# Patient Record
Sex: FEMALE | Race: WHITE | NOT HISPANIC OR LATINO | Employment: FULL TIME | URBAN - NONMETROPOLITAN AREA
[De-identification: names, ages, dates, MRNs, and addresses within clinical notes are randomized per-mention and may not be internally consistent; named-entity substitution may affect disease eponyms.]

---

## 2017-06-12 ENCOUNTER — OFFICE VISIT (OUTPATIENT)
Dept: FAMILY MEDICINE | Facility: CLINIC | Age: 62
End: 2017-06-12
Payer: COMMERCIAL

## 2017-06-12 VITALS
SYSTOLIC BLOOD PRESSURE: 104 MMHG | TEMPERATURE: 98 F | BODY MASS INDEX: 25.49 KG/M2 | WEIGHT: 153 LBS | HEIGHT: 65 IN | DIASTOLIC BLOOD PRESSURE: 64 MMHG | HEART RATE: 85 BPM | OXYGEN SATURATION: 98 %

## 2017-06-12 DIAGNOSIS — H53.9 VISION CHANGES: ICD-10-CM

## 2017-06-12 DIAGNOSIS — Z12.11 SCREEN FOR COLON CANCER: ICD-10-CM

## 2017-06-12 DIAGNOSIS — Z12.4 SCREENING FOR MALIGNANT NEOPLASM OF CERVIX: ICD-10-CM

## 2017-06-12 DIAGNOSIS — H61.22 IMPACTED CERUMEN OF LEFT EAR: ICD-10-CM

## 2017-06-12 DIAGNOSIS — Z11.59 NEED FOR HEPATITIS C SCREENING TEST: ICD-10-CM

## 2017-06-12 DIAGNOSIS — E78.5 HYPERLIPIDEMIA LDL GOAL <160: ICD-10-CM

## 2017-06-12 DIAGNOSIS — Z13.6 CARDIOVASCULAR SCREENING; LDL GOAL LESS THAN 160: ICD-10-CM

## 2017-06-12 DIAGNOSIS — Z00.00 ROUTINE GENERAL MEDICAL EXAMINATION AT A HEALTH CARE FACILITY: Primary | ICD-10-CM

## 2017-06-12 DIAGNOSIS — R91.8 PULMONARY NODULES: ICD-10-CM

## 2017-06-12 LAB
CHOLEST SERPL-MCNC: 229 MG/DL
GLUCOSE SERPL-MCNC: 99 MG/DL (ref 70–99)
HDLC SERPL-MCNC: 70 MG/DL
LDLC SERPL CALC-MCNC: 149 MG/DL
NONHDLC SERPL-MCNC: 159 MG/DL
TRIGL SERPL-MCNC: 49 MG/DL
TSH SERPL DL<=0.005 MIU/L-ACNC: 2.43 MU/L (ref 0.4–4)

## 2017-06-12 PROCEDURE — G0123 SCREEN CERV/VAG THIN LAYER: HCPCS | Performed by: FAMILY MEDICINE

## 2017-06-12 PROCEDURE — 84443 ASSAY THYROID STIM HORMONE: CPT | Performed by: FAMILY MEDICINE

## 2017-06-12 PROCEDURE — 99212 OFFICE O/P EST SF 10 MIN: CPT | Mod: 25 | Performed by: FAMILY MEDICINE

## 2017-06-12 PROCEDURE — 86803 HEPATITIS C AB TEST: CPT | Performed by: FAMILY MEDICINE

## 2017-06-12 PROCEDURE — 99386 PREV VISIT NEW AGE 40-64: CPT | Performed by: FAMILY MEDICINE

## 2017-06-12 PROCEDURE — 87624 HPV HI-RISK TYP POOLED RSLT: CPT | Performed by: FAMILY MEDICINE

## 2017-06-12 PROCEDURE — 80061 LIPID PANEL: CPT | Performed by: FAMILY MEDICINE

## 2017-06-12 PROCEDURE — 82947 ASSAY GLUCOSE BLOOD QUANT: CPT | Performed by: FAMILY MEDICINE

## 2017-06-12 PROCEDURE — 36415 COLL VENOUS BLD VENIPUNCTURE: CPT | Performed by: FAMILY MEDICINE

## 2017-06-12 NOTE — PROGRESS NOTES
SUBJECTIVE:     CC: Edelmira Lorenzo is an 61 year old woman who presents for preventive health visit.     Healthy Habits:    Do you get at least three servings of calcium containing foods daily (dairy, green leafy vegetables, etc.)? yes    Amount of exercise or daily activities, outside of work: 7 day(s) per week    Problems taking medications regularly not applicable    Medication side effects: No    Have you had an eye exam in the past two years? yes    Do you see a dentist twice per year? yes    Do you have sleep apnea, excessive snoring or daytime drowsiness?no      Discussed her emergency department visit from 12/2015.  They did find an incidental pulmonary nodule that was supposed to have follow up in 12 months. She is a never smoker, but was exposed to second hand smoke as child.     Vision changes-had bright lights in her right eye about 2 months ago. Was seen in [place] optometrist, felt it was likely an ophthalmic migraine. Hasn't had one since.     Today's PHQ-2 Score:   PHQ-2 ( 1999 [employer]) 6/12/2017 1/12/2012   Q1: Little interest or pleasure in doing things 0 0   Q2: Feeling down, depressed or hopeless 0 0   PHQ-2 Score 0 0       Abuse: Current or Past(Physical, Sexual or Emotional)- No  Do you feel safe in your environment - Yes    Social History   Substance Use Topics     Smoking status: Never Smoker     Smokeless tobacco: Not on file     Alcohol use Yes      Comment: occ     The patient does not drink >3 drinks per day nor >7 drinks per week.    Recent Labs   Lab Test  01/12/12   1711   CHOL  268*   HDL  79   LDL  176*   TRIG  65   CHOLHDLRATIO  3.0       Reviewed orders with patient.  Reviewed health maintenance and updated orders accordingly - Yes    Mammo Decision Support:  Patient over age 50, mutual decision to screen reflected in health maintenance.    Pertinent mammograms are reviewed under the imaging tab.  History of abnormal Pap smear:   Last 3 Pap Results:   PAP (no units)   Date Value  "  2017 NIL   2012 NIL   2010 NIL       Reviewed and updated as needed this visit by clinical staff  Tobacco  Allergies  Meds  Problems  Med Hx  Surg Hx  Fam Hx  Soc Hx          Reviewed and updated as needed this visit by Provider  Allergies  Meds  Problems        Past Medical History:   Diagnosis Date     Closed fracture of multiple ribs, unspecified      Unspecified viral meningitis       Past Surgical History:   Procedure Laterality Date     SURGICAL HISTORY OF -       (R) Breast lumpectomy - benign     Obstetric History       T0      L0     SAB0   TAB0   Ectopic0   Multiple0   Live Births0       # Outcome Date GA Lbr Saul/2nd Weight Sex Delivery Anes PTL Lv   2 Para            1 Para                   ROS:  C: NEGATIVE for fever, chills, change in weight  I: NEGATIVE for worrisome rashes, moles or lesions  E: NEGATIVE for vision changes or irritation  ENT: NEGATIVE for ear, mouth and throat problems  R: NEGATIVE for significant cough or SOB  B: NEGATIVE for masses, tenderness or discharge  CV: NEGATIVE for chest pain, palpitations or peripheral edema  GI: NEGATIVE for nausea, abdominal pain, heartburn, or change in bowel habits  : NEGATIVE for unusual urinary or vaginal symptoms. No vaginal bleeding.  M: NEGATIVE for significant arthralgias or myalgia  N: NEGATIVE for weakness, dizziness or paresthesias  P: NEGATIVE for changes in mood or affect     BP Readings from Last 3 Encounters:   17 104/64   10/27/15 117/67   12 118/78    Wt Readings from Last 3 Encounters:   17 153 lb (69.4 kg)   12 163 lb (73.9 kg)   12 166 lb (75.3 kg)                  OBJECTIVE:     /64  Pulse 85  Temp 98  F (36.7  C) (Tympanic)  Ht 5' 5\" (1.651 m)  Wt 153 lb (69.4 kg)  LMP 10/21/2006  SpO2 98%  BMI 25.46 kg/m2  EXAM:  GENERAL APPEARANCE: healthy, alert and no distress  EYES: Eyes grossly normal to inspection, PERRL and conjunctivae and " sclerae normal  HENT: nose and mouth without ulcers or lesions, oropharynx clear, oral mucous membranes moist, right ear: normal: no effusions, no erythema, normal landmarks and left ear: occluded with wax  NECK: no adenopathy, no asymmetry, masses, or scars and thyroid normal to palpation  RESP: lungs clear to auscultation - no rales, rhonchi or wheezes  BREAST: normal without masses, tenderness or nipple discharge and no palpable axillary masses or adenopathy  CV: regular rate and rhythm, normal S1 S2, no S3 or S4, no murmur, click or rub, no peripheral edema and peripheral pulses strong  ABDOMEN: soft, nontender, no hepatosplenomegaly, no masses and bowel sounds normal   (female): normal female external genitalia, normal urethral meatus, vaginal mucosal atrophy noted, normal cervix, adnexae, and uterus without masses or abnormal discharge, pap taken  MS: no musculoskeletal defects are noted and gait is age appropriate without ataxia  SKIN: no suspicious lesions or rashes  NEURO: Normal strength and tone, sensory exam grossly normal, mentation intact and speech normal  PSYCH: mentation appears normal and affect normal/bright    ASSESSMENT/PLAN:     Edelmira was seen today for physical.    Diagnoses and all orders for this visit:    Routine general medical examination at a health care facility  -     Glucose    Screen for colon cancer  -     Fecal colorectal cancer screen FIT; Future    Screening for malignant neoplasm of cervix  -     Pap imaged thin layer screen with HPV - recommended age 30 - 65  -     HPV High Risk Types DNA Cervical    Need for hepatitis C screening test  -     Hepatitis C Screen Reflex to HCV RNA Quant and Genotype    Pulmonary nodules  -     CT Chest w/o Contrast; Future    Vision changes  Follow with Ophthalmology     CARDIOVASCULAR SCREENING; LDL GOAL LESS THAN 160  -     Lipid panel reflex to direct LDL    Hyperlipidemia LDL goal <160  -     TSH with free T4 reflex    Impacted cerumen of  "left ear    Other orders  -     A pap thin layer screen    left ear lavaged  CT scan ordered    COUNSELING:   Reviewed preventive health counseling, as reflected in patient instructions       Regular exercise       Healthy diet/nutrition       Vision screening       Osteoporosis Prevention/Bone Health         reports that she has never smoked. She does not have any smokeless tobacco history on file.    Estimated body mass index is 25.46 kg/(m^2) as calculated from the following:    Height as of this encounter: 5' 5\" (1.651 m).    Weight as of this encounter: 153 lb (69.4 kg).       Counseling Resources:  ATP IV Guidelines  Pooled Cohorts Equation Calculator  Breast Cancer Risk Calculator  FRAX Risk Assessment  ICSI Preventive Guidelines  Dietary Guidelines for Americans, 2010  USDA's MyPlate  ASA Prophylaxis  Lung CA Screening    Indy Ascencio MD  [facility]  "

## 2017-06-12 NOTE — LETTER
June 19, 2017    Edelmira THOMAS Bj  [address] [place] [address]    Dear Edelmira,  We are happy to inform you that your PAP smear result from 6/12/17 is normal.  We are now able to do a follow up test on PAP smears. The DNA test is for HPV (Human Papilloma Virus). Cervical cancer is closely linked with certain types of HPV. Your result showed no evidence of high risk HPV.  Therefore we recommend you return in 3 years for your next pap smear and HPV test.  You will still need to return to the clinic every year for an annual exam and other preventive tests.  Please contact the clinic at [phone] with any questions.  Sincerely,    Indy Ascencio MD/kati

## 2017-06-12 NOTE — LETTER
[facility]  [address] [facility] [place] [address]  Phone: [phone]    June 20, 2017    Edelmira THOMAS Bj  [address] [place] [address]    Dear Ms. Lorenzo,    I am writing to inform you of the results of the laboratory tests you had done recently.     Recent Labs   Lab Test  06/12/17   1010  01/12/12   1711   CHOL  229*  268*   HDL  70  79   LDL  149*  176*   TRIG  49  65   CHOLHDLRATIO   --   3.0   NHDL  159*   --      Goal levels are:  Cholesterol: Desirable is less than 200. Borderline is 200-240.  HDL (good cholesterol): Desirable is greater than 40 in men, 50 in women.  LDL (bad cholesterol): Desirable is less than 130, or less than 100 if you have heart disease, peripheral vascular disease or diabetes. Borderline is 130-160.  Triglycerides: Desirable is less than 150. Borderline is 150-300.   Your values are ok.     Your glucose, or blood sugar, a test for diabetes, was 99. Normal is 60-99.  The TSH looks at thyroid function.  Yours was normal.  Your hepatitis C screening was negative.     It is my pleasure to participate in your health care needs. Please feel free to call if any questions or concerns.    Sincerely,          Indy Velazquez MD

## 2017-06-12 NOTE — MR AVS SNAPSHOT
After Visit Summary   6/12/2017    Edelmira Lorenzo    MRN: [id number]           Patient Information     Date Of Birth          1955        Visit Information        Provider Department      6/12/2017 9:00 AM Indy Ascencio MD [facility]        Today's Diagnoses     Routine general medical examination at a health care facility    -  1    Screen for colon cancer        Screening for malignant neoplasm of cervix        Need for hepatitis C screening test        Pulmonary nodules        Vision changes        CARDIOVASCULAR SCREENING; LDL GOAL LESS THAN 160        Hyperlipidemia LDL goal <160        Impacted cerumen of left ear          Care Instructions      Preventive Health Recommendations  Female Ages 50 - 64    Yearly exam: See your health care provider every year in order to  o Review health changes.   o Discuss preventive care.    o Review your medicines if your doctor has prescribed any.      Get a Pap test every three years (unless you have an abnormal result and your provider advises testing more often).    If you get Pap tests with HPV test, you only need to test every 5 years, unless you have an abnormal result.     You do not need a Pap test if your uterus was removed (hysterectomy) and you have not had cancer.    You should be tested each year for STDs (sexually transmitted diseases) if you're at risk.     Have a mammogram every 1 to 2 years.    Have a colonoscopy at age 50, or have a yearly FIT test (stool test). These exams screen for colon cancer.      Have a cholesterol test every 5 years, or more often if advised.    Have a diabetes test (fasting glucose) every three years. If you are at risk for diabetes, you should have this test more often.     If you are at risk for osteoporosis (brittle bone disease), think about having a bone density scan (DEXA).    Shots: Get a flu shot each year. Get a tetanus shot every 10 years.    Nutrition:     Eat at least 5 servings of  fruits and vegetables each day.    Eat whole-grain bread, whole-wheat pasta and brown rice instead of white grains and rice.    Talk to your provider about Calcium and Vitamin D.     Lifestyle    Exercise at least 150 minutes a week (30 minutes a day, 5 days a week). This will help you control your weight and prevent disease.    Limit alcohol to one drink per day.    No smoking.     Wear sunscreen to prevent skin cancer.     See your dentist every six months for an exam and cleaning.    See your eye doctor every 1 to 2 years.            Follow-ups after your visit        Your next 10 appointments already scheduled     Jun 19, 2017  9:15 AM CDT   MA SCREENING DIGITAL BILATERAL with [id number]   [facility] ([facility])    [address] [address] [phone]           Do not use any powder, lotion or deodorant under your arms or on your breast. If you do, we will ask you to remove it before your exam.  Wear comfortable, two-piece clothing.  If you have any allergies, tell your care team.  Bring any previous mammograms from other facilities or have them mailed to the breast center.              Future tests that were ordered for you today     Open Future Orders        Priority Expected Expires Ordered    CT Chest w/o Contrast Routine  6/12/2018 6/12/2017    Fecal colorectal cancer screen FIT Routine 7/3/2017 9/4/2017 6/12/2017            Who to contact     If you have questions or need follow up information about today's clinic visit or your schedule please contact [facility] directly at [phone].  Normal or non-critical lab and imaging results will be communicated to you by MyChart, letter or phone within 4 business days after the clinic has received the results. If you do not hear from us within 7 days, please contact the clinic through MyChart or phone. If you have a critical or abnormal lab result, we will notify you by phone as soon as  "possible.  Submit refill requests through [employer] or call your pharmacy and they will forward the refill request to us. Please allow 3 business days for your refill to be completed.          Additional Information About Your Visit        [employer] Information     [employer] lets you send messages to your doctor, view your test results, renew your prescriptions, schedule appointments and more. To sign up, go to [email or web address] . Click on \"Log in\" on the left side of the screen, which will take you to the Welcome page. Then click on \"Sign up Now\" on the right side of the page.     You will be asked to enter the access code listed below, as well as some personal information. Please follow the directions to create your username and password.     Your access code is: [email or web address]  Expires: 9/10/2017  9:56 AM     Your access code will  in 90 days. If you need help or a new code, please call your [place] clinic or [phone].        Care EveryWhere ID     This is your Care EveryWhere ID. This could be used by other organizations to access your [place] medical records  [id number]        Your Vitals Were     Pulse Temperature Height Last Period Pulse Oximetry BMI (Body Mass Index)    85 98  F (36.7  C) (Tympanic) 5' 5\" (1.651 m) 10/21/2006 98% 25.46 kg/m2       Blood Pressure from Last 3 Encounters:   17 104/64   10/27/15 117/67   12 118/78    Weight from Last 3 Encounters:   17 153 lb (69.4 kg)   12 163 lb (73.9 kg)   12 166 lb (75.3 kg)              We Performed the Following     Glucose     Hepatitis C Screen Reflex to HCV RNA Quant and Genotype     HPV High Risk Types DNA Cervical     Lipid panel reflex to direct LDL     Pap imaged thin layer screen with HPV - recommended age 30 - 65     TSH with free T4 reflex          Today's Medication Changes          These changes are accurate as of: 17  9:56 AM.  If you have any questions, ask your nurse or doctor.             "   Stop taking these medicines if you haven't already. Please contact your care team if you have questions.     MULTI-VITAMIN PO   Stopped by:  Indy Ascencio MD                    Primary Care Provider Office Phone # Fax #    Indy Ascencio [phone]       [facility] [address] [facility] [address]        Thank you!     Thank you for choosing [facility]  for your care. Our goal is always to provide you with excellent care. Hearing back from our patients is one way we can continue to improve our services. Please take a few minutes to complete the written survey that you may receive in the mail after your visit with us. Thank you!             Your Updated Medication List - Protect others around you: Learn how to safely use, store and throw away your medicines at www.disposemymeds.org.          This list is accurate as of: 6/12/17  9:56 AM.  Always use your most recent med list.                   Brand Name Dispense Instructions for use    aspirin 325 MG EC tablet      2 TABLET EVERY 4 HOURS AS NEEDED

## 2017-06-14 LAB
COPATH REPORT: NORMAL
HCV AB SERPL QL IA: NORMAL
PAP: NORMAL

## 2017-06-16 LAB
FINAL DIAGNOSIS: NORMAL
HPV HR 12 DNA CVX QL NAA+PROBE: NEGATIVE
HPV16 DNA SPEC QL NAA+PROBE: NEGATIVE
HPV18 DNA SPEC QL NAA+PROBE: NEGATIVE
SPECIMEN DESCRIPTION: NORMAL

## 2017-06-19 ENCOUNTER — HOSPITAL ENCOUNTER (OUTPATIENT)
Dept: CT IMAGING | Facility: CLINIC | Age: 62
End: 2017-06-19
Attending: FAMILY MEDICINE
Payer: COMMERCIAL

## 2017-06-19 ENCOUNTER — HOSPITAL ENCOUNTER (OUTPATIENT)
Dept: MAMMOGRAPHY | Facility: CLINIC | Age: 62
Discharge: HOME | End: 2017-06-19
Attending: FAMILY MEDICINE | Admitting: FAMILY MEDICINE
Payer: COMMERCIAL

## 2017-06-19 DIAGNOSIS — R91.8 PULMONARY NODULES: ICD-10-CM

## 2017-06-19 DIAGNOSIS — Z12.31 VISIT FOR SCREENING MAMMOGRAM: ICD-10-CM

## 2017-06-19 PROCEDURE — G0202 SCR MAMMO BI INCL CAD: HCPCS

## 2017-06-19 PROCEDURE — 71250 CT THORAX DX C-: CPT

## 2017-06-27 PROCEDURE — 82274 ASSAY TEST FOR BLOOD FECAL: CPT | Performed by: FAMILY MEDICINE

## 2017-06-28 DIAGNOSIS — Z12.11 SCREEN FOR COLON CANCER: ICD-10-CM

## 2017-06-28 LAB — HEMOCCULT STL QL IA: NEGATIVE

## 2017-07-14 ENCOUNTER — HOSPITAL ENCOUNTER (EMERGENCY)
Facility: CLINIC | Age: 62
Discharge: LEFT BEFORE DISCHARGE | End: 2017-07-14
Attending: FAMILY MEDICINE | Admitting: FAMILY MEDICINE
Payer: COMMERCIAL

## 2017-07-14 VITALS
OXYGEN SATURATION: 97 % | HEIGHT: 65 IN | TEMPERATURE: 97 F | BODY MASS INDEX: 25.49 KG/M2 | WEIGHT: 153 LBS | RESPIRATION RATE: 18 BRPM | DIASTOLIC BLOOD PRESSURE: 78 MMHG | SYSTOLIC BLOOD PRESSURE: 132 MMHG | HEART RATE: 78 BPM

## 2017-07-14 DIAGNOSIS — R20.9 DISTURBANCE OF SKIN SENSATION: ICD-10-CM

## 2017-07-14 LAB
BASOPHILS # BLD AUTO: 0 10E9/L (ref 0–0.2)
BASOPHILS NFR BLD AUTO: 0.1 %
CRP SERPL-MCNC: <2.9 MG/L (ref 0–8)
DIFFERENTIAL METHOD BLD: NORMAL
EOSINOPHIL # BLD AUTO: 0.1 10E9/L (ref 0–0.7)
EOSINOPHIL NFR BLD AUTO: 1.8 %
ERYTHROCYTE [DISTWIDTH] IN BLOOD BY AUTOMATED COUNT: 13 % (ref 10–15)
ERYTHROCYTE [SEDIMENTATION RATE] IN BLOOD BY WESTERGREN METHOD: 4 MM/H (ref 0–30)
HCT VFR BLD AUTO: 44.1 % (ref 35–47)
HGB BLD-MCNC: 15.5 G/DL (ref 11.7–15.7)
IMM GRANULOCYTES # BLD: 0 10E9/L (ref 0–0.4)
IMM GRANULOCYTES NFR BLD: 0.1 %
LYMPHOCYTES # BLD AUTO: 1.2 10E9/L (ref 0.8–5.3)
LYMPHOCYTES NFR BLD AUTO: 17.8 %
MCH RBC QN AUTO: 31.6 PG (ref 26.5–33)
MCHC RBC AUTO-ENTMCNC: 35.1 G/DL (ref 31.5–36.5)
MCV RBC AUTO: 90 FL (ref 78–100)
MONOCYTES # BLD AUTO: 0.6 10E9/L (ref 0–1.3)
MONOCYTES NFR BLD AUTO: 8.2 %
NEUTROPHILS # BLD AUTO: 4.9 10E9/L (ref 1.6–8.3)
NEUTROPHILS NFR BLD AUTO: 72 %
PLATELET # BLD AUTO: 312 10E9/L (ref 150–450)
RBC # BLD AUTO: 4.91 10E12/L (ref 3.8–5.2)
WBC # BLD AUTO: 6.8 10E9/L (ref 4–11)

## 2017-07-14 PROCEDURE — 99282 EMERGENCY DEPT VISIT SF MDM: CPT

## 2017-07-14 PROCEDURE — 86666 EHRLICHIA ANTIBODY: CPT | Performed by: FAMILY MEDICINE

## 2017-07-14 PROCEDURE — 85652 RBC SED RATE AUTOMATED: CPT | Performed by: FAMILY MEDICINE

## 2017-07-14 PROCEDURE — 86618 LYME DISEASE ANTIBODY: CPT | Performed by: FAMILY MEDICINE

## 2017-07-14 PROCEDURE — 99283 EMERGENCY DEPT VISIT LOW MDM: CPT | Performed by: FAMILY MEDICINE

## 2017-07-14 PROCEDURE — 85025 COMPLETE CBC W/AUTO DIFF WBC: CPT | Performed by: FAMILY MEDICINE

## 2017-07-14 PROCEDURE — 86140 C-REACTIVE PROTEIN: CPT | Performed by: FAMILY MEDICINE

## 2017-07-14 RX ORDER — ASPIRIN 325 MG
650 TABLET ORAL DAILY PRN
COMMUNITY

## 2017-07-14 ASSESSMENT — ENCOUNTER SYMPTOMS
SHORTNESS OF BREATH: 0
DIFFICULTY URINATING: 0
HEADACHES: 1
FEVER: 0
JOINT SWELLING: 0
NUMBNESS: 1
DYSURIA: 0
FATIGUE: 1
ACTIVITY CHANGE: 0
NAUSEA: 0

## 2017-07-14 NOTE — ED PROVIDER NOTES
History     Chief Complaint   Patient presents with     Numbness     numbness and tingling in hands and feet x 3 days. no other symptoms     HPI  Edelmira Lorenzo is a 61 year old female with a remote history of viral meningitis who presents to the ED today for evaluation of a 3 day history of numbness and tingling in bilateral hands and feet. Patient states that 3 days ago she started to develop intermittent bilateral numbness in her hands and feet. She reports she is still able to do everyday activities and her balance and coordination are normal. She does admit to feeling more fatigued recently as well as a mild headache behind her eyes. The patient states she has been exposed to ticks recently [place]. She otherwise denies any nausea, vision changes, difficulty speaking, chest pain and a rash. She reports no ill contacts with unusual neurological symptoms. She is a nonsmoker.     Patient Active Problem List   Diagnosis     CARDIOVASCULAR SCREENING; LDL GOAL LESS THAN 160     Advanced directives, counseling/discussion     Pulmonary nodules     Current Outpatient Prescriptions   Medication Sig Dispense Refill     aspirin 325 MG tablet Take 650 mg by mouth daily as needed for moderate pain       No Known Allergies    I have reviewed the Medications, Allergies, Past Medical and Surgical History, and Social History in the Epic system.         Review of Systems   Constitutional: Positive for fatigue. Negative for activity change and fever.   Eyes: Negative for visual disturbance.   Respiratory: Negative for shortness of breath.    Cardiovascular: Negative for chest pain.   Gastrointestinal: Negative for nausea.   Genitourinary: Negative for difficulty urinating and dysuria.   Musculoskeletal: Negative for joint swelling.   Skin: Negative for rash.   Neurological: Positive for numbness and headaches.   Hematological: Negative.    Psychiatric/Behavioral: Negative.    Unremarkable except  "noted above.     Physical Exam   BP: 132/78  Pulse: 78  Temp: 97  F (36.1  C)  Resp: 18  Height: 165.1 cm (5' 5\")  Weight: 69.4 kg (153 lb)  SpO2: 97 %  Physical Exam   Constitutional: She is oriented to person, place, and time. She appears well-developed. No distress.   HENT:   Head: Normocephalic.   Mouth/Throat: Oropharynx is clear and moist.   Eyes: EOM are normal. Pupils are equal, round, and reactive to light.   Neck: Neck supple. No thyromegaly present.   Cardiovascular: Regular rhythm.    No murmur heard.  Pulmonary/Chest: Breath sounds normal.   Abdominal: She exhibits no distension.   Musculoskeletal: She exhibits no edema or tenderness.   Lymphadenopathy:     She has no cervical adenopathy.   Neurological: She is oriented to person, place, and time. No cranial nerve deficit. She exhibits normal muscle tone. Coordination normal.   Skin: No rash noted.   Psychiatric: She has a normal mood and affect.       ED Course     ED Course     Procedures             Critical Care time:  none               Labs Ordered and Resulted from Time of ED Arrival Up to the Time of Departure from the ED - No data to display  No results found for this or any previous visit (from the past 24 hour(s)).    Medications - No data to display    5:14 PM Patient assessed.     Assessments & Plan (with Medical Decision Making)     Edelmira presents with recent SX of numbness/tingling in hands and feet w/o motor or coordination problems. She is in o/w good health. Her examination was normal. She appeared at low probability of having major problems such as cerebrovascular or Diabetes. We sent some labs to R/O certain conditions of infectious or metabolic nature. She will /U on results. Expectant management was advised and she is agreeable with this plan.      I have reviewed the nursing notes.    I have reviewed the findings, diagnosis, plan and need for follow up with the patient.       Discharge Medication List as of 7/14/2017  5:39 PM    "       Final diagnoses:   Disturbance of skin sensation     This document serves as a record of the services and decisions personally performed and made by Vinny Wilson MD. It was created on HIS/HER behalf by Karen Waite, a trained medical scribe. The creation of this document is based the provider's statements to the medical scribe.  Karen Waite 5:14 PM 7/14/2017    Provider:   The information in this document, created by the medical scribe for me, accurately reflects the services I personally performed and the decisions made by me. I have reviewed and approved this document for accuracy prior to leaving the patient care area.  Vinny Wilson MD 5:14 PM 7/14/2017 7/14/2017   [facility] EMERGENCY DEPARTMENT     Vinny Wilson MD  07/21/17 0827

## 2017-07-14 NOTE — ED AVS SNAPSHOT
[facility]    [address] [address]    Phone:  [phone]    Fax:  [phone]                                       Edelmira Lorenzo   MRN: [id number]    Department:  [facility]   Date of Visit:  7/14/2017           After Visit Summary Signature Page     I have received my discharge instructions, and my questions have been answered. I have discussed any challenges I see with this plan with the nurse or doctor.    ..........................................................................................................................................  Patient/Patient Representative Signature      ..........................................................................................................................................  Patient Representative Print Name and Relationship to Patient    ..................................................               ................................................  Date                                            Time    ..........................................................................................................................................  Reviewed by Signature/Title    ...................................................              ..............................................  Date                                                            Time

## 2017-07-14 NOTE — ED AVS SNAPSHOT
[facility] Emergency Department    [address] [address]    Phone:  [phone]    Fax:  [phone]                                       Edelmira Lorenzo   MRN: [id number]    Department:  [facility] Emergency Department   Date of Visit:  7/14/2017           Patient Information     Date Of Birth          1955        Your diagnoses for this visit were:     Disturbance of skin sensation        You were seen by Vinny Wilson MD.        Discharge Instructions         Please call your Clinic and have Nurse check your labs.    No activity restrictions.    24 Hour Appointment Hotline       To make an appointment at any [facility], call [phone] ([phone]). If you don't have a family doctor or clinic, we will help you find one. [facility] clinics are conveniently located to serve the needs of you and your family.             Review of your medicines      Our records show that you are taking the medicines listed below. If these are incorrect, please call your family doctor or clinic.        Dose / Directions Last dose taken    aspirin 325 MG tablet   Dose:  650 mg        Take 650 mg by mouth daily as needed for moderate pain   Refills:  0                Procedures and tests performed during your visit     Anaplasma phagocytoph antibody IgG IgM    CBC with platelets, differential    CRP Inflammation    Erythrocyte sedimentation rate auto    Lyme Disease Dot with reflex to WB Serum      Orders Needing Specimen Collection     None      Pending Results     No orders found from 7/12/2017 to 7/15/2017.            Pending Culture Results     No orders found from 7/12/2017 to 7/15/2017.            Pending Results Instructions     If you had any lab results that were not finalized at the time of your Discharge, you can call the ED Lab Result RN at [phone]. You will be contacted by this team for any positive Lab results or changes in treatment. The nurses are available 7 days  "a week from 10A to 6:30P.  You can leave a message 24 hours per day and they will return your call.        Test Results From Your Hospital Stay               Thank you for choosing [facility]       Thank you for choosing [facility] for your care. Our goal is always to provide you with excellent care. Hearing back from our patients is one way we can continue to improve our services. Please take a few minutes to complete the written survey that you may receive in the mail after you visit with us. Thank you!        [employer] Information     [employer] lets you send messages to your doctor, view your test results, renew your prescriptions, schedule appointments and more. To sign up, go to [email or web address] . Click on \"Log in\" on the left side of the screen, which will take you to the Welcome page. Then click on \"Sign up Now\" on the right side of the page.     You will be asked to enter the access code listed below, as well as some personal information. Please follow the directions to create your username and password.     Your access code is: [email or web address]  Expires: 9/10/2017  9:56 AM     Your access code will  in 90 days. If you need help or a new code, please call your [facility] clinic or [phone].        Care EveryWhere ID     This is your Care EveryWhere ID. This could be used by other organizations to access your [facility] medical records  [id number]        Equal Access to Services     GILDARDO POSEY : Hadpaloma Harvey, waaxda david, qaybta kaaljaspreet welsh . So [facility] [phone].    ATENCIÓN: Si habla español, tiene a marley disposición servicios gratuitos de asistencia lingüística. Chung al [phone].    We comply with applicable federal civil rights laws and [place] laws. We do not discriminate on the basis of race, color, national origin, age, disability sex, sexual orientation or gender identity.            After Visit Summary       This is your " record. Keep this with you and show to your community pharmacist(s) and doctor(s) at your next visit.

## 2017-07-14 NOTE — ED NOTES
"Pt here with concerns of possible \"tick exposure, [place] that had many ticks falling off him\" onset Wednesday with tingling and numbness on and off.    "

## 2017-07-17 LAB
A PHAGOCYTOPH IGG TITR SER IF: NORMAL {TITER}
A PHAGOCYTOPH IGM TITR SER IF: NORMAL {TITER}
B BURGDOR IGG+IGM SER QL: NORMAL (ref 0–0.89)

## 2017-07-21 ASSESSMENT — ENCOUNTER SYMPTOMS
PSYCHIATRIC NEGATIVE: 1
HEMATOLOGIC/LYMPHATIC NEGATIVE: 1

## 2018-06-21 ENCOUNTER — HOSPITAL ENCOUNTER (OUTPATIENT)
Dept: MAMMOGRAPHY | Facility: CLINIC | Age: 63
Discharge: HOME | End: 2018-06-21
Attending: FAMILY MEDICINE | Admitting: FAMILY MEDICINE
Payer: COMMERCIAL

## 2018-06-21 DIAGNOSIS — Z12.31 VISIT FOR SCREENING MAMMOGRAM: ICD-10-CM

## 2018-06-21 PROCEDURE — 77067 SCR MAMMO BI INCL CAD: CPT

## 2019-11-02 ENCOUNTER — IMMUNIZATION (OUTPATIENT)
Dept: FAMILY MEDICINE | Facility: CLINIC | Age: 64
End: 2019-11-02
Payer: COMMERCIAL

## 2019-11-02 PROCEDURE — 90682 RIV4 VACC RECOMBINANT DNA IM: CPT

## 2019-11-02 PROCEDURE — 90471 IMMUNIZATION ADMIN: CPT

## 2021-03-13 ENCOUNTER — IMMUNIZATION (OUTPATIENT)
Dept: FAMILY MEDICINE | Facility: CLINIC | Age: 66
End: 2021-03-13
Payer: COMMERCIAL

## 2021-03-13 PROCEDURE — 0011A PR COVID VAC MODERNA 100 MCG/0.5 ML IM: CPT

## 2021-03-13 PROCEDURE — 91301 PR COVID VAC MODERNA 100 MCG/0.5 ML IM: CPT

## 2021-04-04 ENCOUNTER — HEALTH MAINTENANCE LETTER (OUTPATIENT)
Age: 66
End: 2021-04-04

## 2021-04-10 ENCOUNTER — IMMUNIZATION (OUTPATIENT)
Dept: FAMILY MEDICINE | Facility: CLINIC | Age: 66
End: 2021-04-10
Attending: FAMILY MEDICINE
Payer: COMMERCIAL

## 2021-04-10 PROCEDURE — 0012A PR COVID VAC MODERNA 100 MCG/0.5 ML IM: CPT

## 2021-04-10 PROCEDURE — 91301 PR COVID VAC MODERNA 100 MCG/0.5 ML IM: CPT

## 2021-09-19 ENCOUNTER — HEALTH MAINTENANCE LETTER (OUTPATIENT)
Age: 66
End: 2021-09-19

## 2022-03-29 ENCOUNTER — OFFICE VISIT (OUTPATIENT)
Dept: FAMILY MEDICINE | Facility: CLINIC | Age: 67
End: 2022-03-29
Payer: COMMERCIAL

## 2022-03-29 ENCOUNTER — TELEPHONE (OUTPATIENT)
Dept: FAMILY MEDICINE | Facility: CLINIC | Age: 67
End: 2022-03-29

## 2022-03-29 VITALS
OXYGEN SATURATION: 100 % | HEART RATE: 75 BPM | RESPIRATION RATE: 16 BRPM | BODY MASS INDEX: 27.99 KG/M2 | HEIGHT: 65 IN | WEIGHT: 168 LBS | TEMPERATURE: 97.5 F | DIASTOLIC BLOOD PRESSURE: 78 MMHG | SYSTOLIC BLOOD PRESSURE: 124 MMHG

## 2022-03-29 DIAGNOSIS — H61.22 IMPACTED CERUMEN OF LEFT EAR: Primary | ICD-10-CM

## 2022-03-29 DIAGNOSIS — Z12.11 SCREEN FOR COLON CANCER: ICD-10-CM

## 2022-03-29 DIAGNOSIS — M54.2 CERVICALGIA: ICD-10-CM

## 2022-03-29 PROCEDURE — 69209 REMOVE IMPACTED EAR WAX UNI: CPT | Mod: LT | Performed by: INTERNAL MEDICINE

## 2022-03-29 PROCEDURE — 99202 OFFICE O/P NEW SF 15 MIN: CPT | Mod: 25 | Performed by: INTERNAL MEDICINE

## 2022-03-29 ASSESSMENT — PAIN SCALES - GENERAL: PAINLEVEL: NO PAIN (1)

## 2022-03-29 NOTE — TELEPHONE ENCOUNTER
While at todays appointment , Dr. Renteria forgot to give patient a FIT test to patient .   FIT test can not be mailed yet as temps outside still go below freezing.     I will leave a FIT test in a envelope at  in [facility] [place] for her to . - Bhaskar CARRENO CMA

## 2022-03-29 NOTE — PATIENT INSTRUCTIONS
Health Care Maintenance:  1. Recommend FIT test for colon cancer screening  2. You are due for a mammogram.  Please call [phone] to schedule.  3. Recommend tetanus and pneumonia     Swollen neck:  1. Exam was normal today  2. If symptoms persist, then follow-up - would check thyroid and blood count, and ultrasound of neck    Ears:  1. Will remove wax today

## 2022-03-29 NOTE — PROGRESS NOTES
"  Assessment & Plan     Impacted cerumen of left ear -CMA lavaged with good results.  Patient had resolution of discomfort.  - REMOVE IMPACTED CERUMEN    Cervicalgia -she has been having tension pain in her neck.  If the removal of the cerumen and neck pain does not resolve her symptoms, advised to follow-up in clinic.  No palpable lymph nodes on exam today    Screen for colon cancer -the team neglected to give her the fit test before she left.  We will mail it out  - Fecal colorectal cancer screen (FIT); Future             BMI:   Estimated body mass index is 27.96 kg/m  as calculated from the following:    Height as of this encounter: 1.651 m (5' 5\").    Weight as of this encounter: 76.2 kg (168 lb).       Patient Instructions   Health Care Maintenance:  1. Recommend FIT test for colon cancer screening  2. You are due for a mammogram.  Please call [phone] to schedule.  3. Recommend tetanus and pneumonia     Swollen neck:  1. Exam was normal today  2. If symptoms persist, then follow-up - would check thyroid and blood count, and ultrasound of neck    Ears:  1. Will remove wax today      No follow-ups on file.    Nena Renteria [facility]    Pam Hickey is a 66 year old who presents for the following health issues     History of Present Illness       Reason for visit:  I have wax in my ears that is bothering me,  also swollen nodes in neck -  Symptom onset:  1-2 weeks ago  Symptoms include:  Same as above  Symptom intensity:  Mild  Symptom progression:  Improving  Had these symptoms before:  Yes  Has tried/received treatment for these symptoms:  Yes  Previous treatment was successful:  Yes  Prior treatment description:  Removed wax form ears  What makes it worse:  No  What makes it better:  No    She eats 4 or more servings of fruits and vegetables daily.She consumes 1 sweetened beverage(s) daily.She exercises with enough effort to increase her heart rate 9 or less minutes " -- DO NOT REPLY / DO NOT REPLY ALL --  -- Message is from the [facility]--    Result Request  Type of Test / Lab: mammogram   Date Test / Lab: 11-  Location: [facility]   Test / Lab ordered/authorized by: Dr hurley    Other comments:pelase call to go over results. patient received an email stating that she has to have the test redone because something was seen     Preferred Delivery Method   Call back patient with results.  Phone number:  [phone]    Caller Information       Type Contact Phone    12/07/2020 04:43 PM CST Phone (Incoming) Sandhya Wheatley (Self) [phone] (M)          Alternative phone number: none    Turnaround time given to caller:   \"This message will be sent to [state Provider's name]. The clinical team will fulfill your request as soon as they review your message.\"   "per day.  She exercises with enough effort to increase her heart rate 7 days per week.   She is taking medications regularly.       Acute Illness  Acute illness concerns: swollen lymph nodes   Onset/Duration: 1 week  Symptoms:  Fever: no  Chills/Sweats: no  Headache (location?): YES- minor  Sinus Pressure: no  Conjunctivitis:  no  Ear Pain: YES: bilateral  Rhinorrhea: no  Congestion: no  Sore Throat: no  Cough: no  Wheeze: no  Decreased Appetite: no  Nausea: no  Vomiting: no  Diarrhea: no  Dysuria/Freq.: no  Dysuria or Hematuria: no  Fatigue/Achiness: no  Sick/Strep Exposure: no  Therapies tried and outcome: ASA as needed  --thinks lymph nodes in neck are enlarged  --thinks it is ear wax  --no URI symptoms of cough/cold  --no fevers/chills, night sweats  -having neck pain that she feels is muscle tension; is not feeling well because of this .          Current Outpatient Medications   Medication Sig Dispense Refill     aspirin 325 MG tablet Take 650 mg by mouth daily as needed for moderate pain          Review of Systems   Constitutional, HEENT, cardiovascular, pulmonary, GI, , musculoskeletal, neuro, skin, endocrine and psych systems are negative, except as otherwise noted.      Objective    /78 (BP Location: Right arm, Patient Position: Chair, Cuff Size: Adult Regular)   Pulse 75   Temp 97.5  F (36.4  C) (Tympanic)   Resp 16   Ht 1.651 m (5' 5\")   Wt 76.2 kg (168 lb)   LMP 10/21/2006   SpO2 100%   BMI 27.96 kg/m    Body mass index is 27.96 kg/m .  Physical Exam   GENERAL APPEARANCE: healthy, alert and no distress  HENT: nose and mouth without ulcers or lesions, right TM's pearly grey, normal light reflex  and left canal occluded with cerumen.  CMA lavaged with good results, canal clear and TM normal  NECK: no adenopathy, no asymmetry, masses, or scars and thyroid normal to palpation                "

## 2022-04-14 ENCOUNTER — OFFICE VISIT (OUTPATIENT)
Dept: FAMILY MEDICINE | Facility: CLINIC | Age: 67
End: 2022-04-14
Payer: COMMERCIAL

## 2022-04-14 VITALS
BODY MASS INDEX: 27.79 KG/M2 | RESPIRATION RATE: 18 BRPM | WEIGHT: 162.8 LBS | HEIGHT: 64 IN | TEMPERATURE: 97.2 F | OXYGEN SATURATION: 99 % | HEART RATE: 84 BPM | DIASTOLIC BLOOD PRESSURE: 70 MMHG | SYSTOLIC BLOOD PRESSURE: 136 MMHG

## 2022-04-14 DIAGNOSIS — Z78.0 ASYMPTOMATIC MENOPAUSE: ICD-10-CM

## 2022-04-14 DIAGNOSIS — Z13.6 CARDIOVASCULAR SCREENING; LDL GOAL LESS THAN 160: ICD-10-CM

## 2022-04-14 DIAGNOSIS — Z12.31 VISIT FOR SCREENING MAMMOGRAM: ICD-10-CM

## 2022-04-14 DIAGNOSIS — Z00.00 ENCOUNTER FOR WELLNESS EXAMINATION IN ADULT: Primary | ICD-10-CM

## 2022-04-14 LAB
CHOLEST SERPL-MCNC: 280 MG/DL
FASTING STATUS PATIENT QL REPORTED: NO
FASTING STATUS PATIENT QL REPORTED: NO
GLUCOSE BLD-MCNC: 91 MG/DL (ref 70–99)
HDLC SERPL-MCNC: 74 MG/DL
LDLC SERPL CALC-MCNC: 187 MG/DL
NONHDLC SERPL-MCNC: 206 MG/DL
TRIGL SERPL-MCNC: 95 MG/DL

## 2022-04-14 PROCEDURE — 82947 ASSAY GLUCOSE BLOOD QUANT: CPT | Performed by: FAMILY MEDICINE

## 2022-04-14 PROCEDURE — 80061 LIPID PANEL: CPT | Performed by: FAMILY MEDICINE

## 2022-04-14 PROCEDURE — 99397 PER PM REEVAL EST PAT 65+ YR: CPT | Performed by: FAMILY MEDICINE

## 2022-04-14 PROCEDURE — 36415 COLL VENOUS BLD VENIPUNCTURE: CPT | Performed by: FAMILY MEDICINE

## 2022-04-14 ASSESSMENT — ENCOUNTER SYMPTOMS
HEARTBURN: 0
ARTHRALGIAS: 0
WEAKNESS: 0
COUGH: 0
CHILLS: 0
FREQUENCY: 0
HEADACHES: 0
NAUSEA: 0
DYSURIA: 0
ABDOMINAL PAIN: 0
MYALGIAS: 0
PARESTHESIAS: 0
NERVOUS/ANXIOUS: 0
HEMATOCHEZIA: 0
EYE PAIN: 0
JOINT SWELLING: 0
SORE THROAT: 0
BREAST MASS: 0
PALPITATIONS: 0
SHORTNESS OF BREATH: 0
FEVER: 0
HEMATURIA: 0
DIZZINESS: 0
CONSTIPATION: 0
DIARRHEA: 0

## 2022-04-14 ASSESSMENT — PAIN SCALES - GENERAL: PAINLEVEL: NO PAIN (0)

## 2022-04-14 ASSESSMENT — ACTIVITIES OF DAILY LIVING (ADL): CURRENT_FUNCTION: NO ASSISTANCE NEEDED

## 2022-04-14 NOTE — PROGRESS NOTES
"SUBJECTIVE:   Edelmira Lorenzo is a 66 year old female who presents for Preventive Visit.      Patient has been advised of split billing requirements and indicates understanding: Yes  Are you in the first 12 months of your Medicare coverage?  No  PATIENT IS NOT ON MEDICARE    Healthy Habits:     In general, how would you rate your overall health?  Good    Frequency of exercise:  6-7 days/week    Duration of exercise:  45-60 minutes    Do you usually eat at least 4 servings of fruit and vegetables a day, include whole grains    & fiber and avoid regularly eating high fat or \"junk\" foods?  Yes    Taking medications regularly:  Not Applicable    Medication side effects:  Not applicable    Ability to successfully perform activities of daily living:  No assistance needed    Home Safety:  Lack of grab bars in the bathroom    Hearing Impairment:  No hearing concerns    In the past 6 months, have you been bothered by leaking of urine?  No    In general, how would you rate your overall mental or emotional health?  Good      PHQ-2 Total Score: 0    Additional concerns today:  No    Do you feel safe in your environment? Yes    Have you ever done Advance Care Planning? (For example, a Health Directive, POLST, or a discussion with a medical provider or your loved ones about your wishes): No, advance care planning information given to patient to review.  Patient plans to discuss their wishes with loved ones or provider.         Fall risk  Fallen 2 or more times in the past year?: No  Any fall with injury in the past year?: No    Cognitive Screening   1) Repeat 3 items (Leader, Season, Table)    2) Clock draw: NORMAL  3) 3 item recall: Recalls 2 objects   Results: NORMAL clock, 1-2 items recalled: COGNITIVE IMPAIRMENT LESS LIKELY    Mini-CogTM Copyright FABRIZIO Noble. Licensed by the author for use in [employer]; reprinted with permission (tayler@.[email or web address]). All rights reserved.      Do you have sleep apnea, excessive snoring or " daytime drowsiness?: no    Reviewed and updated as needed this visit by clinical staff   Tobacco  Allergies  Meds  Problems  Med Hx  Surg Hx  Fam Hx  Soc   Hx          Reviewed and updated as needed this visit by Provider                   Social History     Tobacco Use     Smoking status: Never Smoker     Smokeless tobacco: Never Used   Substance Use Topics     Alcohol use: Not Currently     Comment: occ     If you drink alcohol do you typically have >3 drinks per day or >7 drinks per week? No    Alcohol Use 4/14/2022   Prescreen: >3 drinks/day or >7 drinks/week? Not Applicable   Prescreen: >3 drinks/day or >7 drinks/week? -       She had her left ear cleaned out a few weeks agree    Recent Labs   Lab Test 06/12/17  1010   CHOL 229*   HDL 70   *   TRIG 49          Current providers sharing in care for this patient include:   Patient Care Team:  Indy Ortiz MD as PCP - General    The following health maintenance items are reviewed in Epic and correct as of today:  Health Maintenance Due   Topic Date Due     DEXA  Never done     ANNUAL REVIEW OF HM ORDERS  Never done     ZOSTER IMMUNIZATION (1 of 2) Never done     ADVANCE CARE PLANNING  01/19/2017     COLORECTAL CANCER SCREENING  06/27/2018     MAMMO SCREENING  06/21/2020     FALL RISK ASSESSMENT  Never done     Pneumococcal Vaccine: 65+ Years (1 of 1 - PPSV23) Never done     INFLUENZA VACCINE (1) 09/01/2021     DTAP/TDAP/TD IMMUNIZATION (2 - Td or Tdap) 01/12/2022     BP Readings from Last 3 Encounters:   04/14/22 136/70   03/29/22 124/78   07/14/17 132/78    Wt Readings from Last 3 Encounters:   04/14/22 73.8 kg (162 lb 12.8 oz)   03/29/22 76.2 kg (168 lb)   07/14/17 69.4 kg (153 lb)                   FHS-7:   Breast CA Risk Assessment (FHS-7) 4/14/2022   Did any of your first-degree relatives have breast or ovarian cancer? Yes   Did any of your relatives have bilateral breast cancer? Unknown   Did any man in your family have breast cancer? No  "  Did any woman in your family have breast and ovarian cancer? No   Did any woman in your family have breast cancer before age 50 y? No   Do you have 2 or more relatives with breast and/or ovarian cancer? No   Do you have 2 or more relatives with breast and/or bowel cancer? No       Mammogram Screening: Recommended mammography every 1-2 years with patient discussion and risk factor consideration  Pertinent mammograms are reviewed under the imaging tab.    Review of Systems   Constitutional: Negative for chills and fever.   HENT: Negative for congestion, ear pain, hearing loss and sore throat.    Eyes: Negative for pain and visual disturbance.   Respiratory: Negative for cough and shortness of breath.    Cardiovascular: Negative for chest pain, palpitations and peripheral edema.   Gastrointestinal: Negative for abdominal pain, constipation, diarrhea, heartburn, hematochezia and nausea.   Breasts:  Negative for tenderness, breast mass and discharge.   Genitourinary: Negative for dysuria, frequency, genital sores, hematuria, pelvic pain, urgency, vaginal bleeding and vaginal discharge.   Musculoskeletal: Negative for arthralgias, joint swelling and myalgias.   Skin: Negative for rash.   Neurological: Negative for dizziness, weakness, headaches and paresthesias.   Psychiatric/Behavioral: Negative for mood changes. The patient is not nervous/anxious.        OBJECTIVE:   /70   Pulse 84   Temp 97.2  F (36.2  C)   Resp 18   Ht 1.632 m (5' 4.25\")   Wt 73.8 kg (162 lb 12.8 oz)   LMP 10/21/2006   SpO2 99%   BMI 27.73 kg/m   Estimated body mass index is 27.73 kg/m  as calculated from the following:    Height as of this encounter: 1.632 m (5' 4.25\").    Weight as of this encounter: 73.8 kg (162 lb 12.8 oz).  Physical Exam  GENERAL: healthy, alert and no distress  EYES: Eyes grossly normal to inspection, PERRL and conjunctivae and sclerae normal  HENT: ear canals and TM's normal, mask in place  NECK: no adenopathy, " "no asymmetry, masses, or scars and thyroid normal to palpation  RESP: lungs clear to auscultation - no rales, rhonchi or wheezes  BREAST: normal without masses, tenderness or nipple discharge and no palpable axillary masses or adenopathy  CV: regular rate and rhythm, normal S1 S2, no S3 or S4, no murmur, click or rub, no peripheral edema and peripheral pulses strong  ABDOMEN: soft, nontender, no hepatosplenomegaly, no masses and bowel sounds normal  MS: no gross musculoskeletal defects noted, no edema  SKIN: no suspicious lesions or rashes  NEURO: Normal strength and tone, mentation intact and speech normal  PSYCH: mentation appears normal, affect normal/bright        ASSESSMENT / PLAN:   Edelmira was seen today for physical.    Diagnoses and all orders for this visit:    Encounter for wellness examination in adult  -     Glucose; Future    Visit for screening mammogram  -     MA SCREENING DIGITAL BILAT - Future  (s+30); Future    CARDIOVASCULAR SCREENING; LDL GOAL LESS THAN 160  -     Lipid panel reflex to direct LDL Fasting; Future    Asymptomatic menopause  -     DEXA HIP/PELVIS/SPINE - Future; Future    Other orders  -     REVIEW OF HEALTH MAINTENANCE PROTOCOL ORDERS            COUNSELING:  Reviewed preventive health counseling, as reflected in patient instructions  Special attention given to:       Osteoporosis prevention/bone health    Estimated body mass index is 27.73 kg/m  as calculated from the following:    Height as of this encounter: 1.632 m (5' 4.25\").    Weight as of this encounter: 73.8 kg (162 lb 12.8 oz).        She reports that she has never smoked. She has never used smokeless tobacco.      Appropriate preventive services were discussed with this patient, including applicable screening as appropriate for cardiovascular disease, diabetes, osteopenia/osteoporosis, and glaucoma.  As appropriate for age/gender, discussed screening for colorectal cancer, prostate cancer, breast cancer, and cervical " cancer. Checklist reviewing preventive services available has been given to the patient.    Reviewed patients plan of care and provided an AVS. The Basic Care Plan (routine screening as documented in Health Maintenance) for Edelmira meets the Care Plan requirement. This Care Plan has been established and reviewed with the Patient.    Counseling Resources:  ATP IV Guidelines  Pooled Cohorts Equation Calculator  Breast Cancer Risk Calculator  Breast Cancer: Medication to Reduce Risk  FRAX Risk Assessment  ICSI Preventive Guidelines  Dietary Guidelines for Americans, 2010  USDA's MyPlate  ASA Prophylaxis  Lung CA Screening    Indy Velazquez MD  [facility]    Identified Health Risks:

## 2022-04-14 NOTE — PATIENT INSTRUCTIONS
Patient Education   Personalized Prevention Plan  You are due for the preventive services outlined below.  Your care team is available to assist you in scheduling these services.  If you have already completed any of these items, please share that information with your care team to update in your medical record.  Health Maintenance Due   Topic Date Due     Osteoporosis Screening  Never done     ANNUAL REVIEW OF HM ORDERS  Never done     Zoster (Shingles) Vaccine (1 of 2) Never done     Discuss Advance Care Planning  01/19/2017     Colorectal Cancer Screening  06/27/2018     Mammogram  06/21/2020     FALL RISK ASSESSMENT  Never done     Pneumococcal Vaccine (1 of 1 - PPSV23) Never done     Flu Vaccine (1) 09/01/2021     Diptheria Tetanus Pertussis (DTAP/TDAP/TD) Vaccine (2 - Td or Tdap) 01/12/2022

## 2022-06-01 ENCOUNTER — HOSPITAL ENCOUNTER (OUTPATIENT)
Dept: BONE DENSITY | Facility: CLINIC | Age: 67
Discharge: HOME | End: 2022-06-01
Attending: FAMILY MEDICINE
Payer: COMMERCIAL

## 2022-06-01 ENCOUNTER — HOSPITAL ENCOUNTER (OUTPATIENT)
Dept: MAMMOGRAPHY | Facility: CLINIC | Age: 67
Discharge: HOME | End: 2022-06-01
Attending: FAMILY MEDICINE
Payer: COMMERCIAL

## 2022-06-01 DIAGNOSIS — Z78.0 ASYMPTOMATIC MENOPAUSE: ICD-10-CM

## 2022-06-01 DIAGNOSIS — Z12.31 VISIT FOR SCREENING MAMMOGRAM: ICD-10-CM

## 2022-06-01 PROCEDURE — 77080 DXA BONE DENSITY AXIAL: CPT

## 2022-06-01 PROCEDURE — 77067 SCR MAMMO BI INCL CAD: CPT

## 2022-11-20 ENCOUNTER — HEALTH MAINTENANCE LETTER (OUTPATIENT)
Age: 67
End: 2022-11-20

## 2023-06-02 ENCOUNTER — HEALTH MAINTENANCE LETTER (OUTPATIENT)
Age: 68
End: 2023-06-02

## 2024-06-22 ENCOUNTER — HEALTH MAINTENANCE LETTER (OUTPATIENT)
Age: 69
End: 2024-06-22

## 2024-08-31 ENCOUNTER — HEALTH MAINTENANCE LETTER (OUTPATIENT)
Age: 69
End: 2024-08-31

## 2025-03-23 ENCOUNTER — HOSPITAL ENCOUNTER (EMERGENCY)
Facility: CLINIC | Age: 70
Discharge: HOME | End: 2025-03-23
Attending: PHYSICIAN ASSISTANT | Admitting: PHYSICIAN ASSISTANT
Payer: COMMERCIAL

## 2025-03-23 VITALS
RESPIRATION RATE: 18 BRPM | SYSTOLIC BLOOD PRESSURE: 149 MMHG | TEMPERATURE: 97.5 F | HEART RATE: 66 BPM | DIASTOLIC BLOOD PRESSURE: 75 MMHG | OXYGEN SATURATION: 99 %

## 2025-03-23 DIAGNOSIS — R21 RASH AND NONSPECIFIC SKIN ERUPTION: ICD-10-CM

## 2025-03-23 PROCEDURE — 99213 OFFICE O/P EST LOW 20 MIN: CPT | Performed by: PHYSICIAN ASSISTANT

## 2025-03-23 PROCEDURE — 87798 DETECT AGENT NOS DNA AMP: CPT | Performed by: PHYSICIAN ASSISTANT

## 2025-03-23 PROCEDURE — G0463 HOSPITAL OUTPT CLINIC VISIT: HCPCS

## 2025-03-23 RX ORDER — MUPIROCIN 20 MG/G
OINTMENT TOPICAL 3 TIMES DAILY
Qty: 22 G | Refills: 0 | Status: SHIPPED | OUTPATIENT
Start: 2025-03-23

## 2025-03-23 RX ORDER — VALACYCLOVIR HYDROCHLORIDE 1 G/1
1000 TABLET, FILM COATED ORAL 3 TIMES DAILY
Qty: 21 TABLET | Refills: 0 | Status: SHIPPED | OUTPATIENT
Start: 2025-03-23 | End: 2025-03-30

## 2025-03-23 ASSESSMENT — COLUMBIA-SUICIDE SEVERITY RATING SCALE - C-SSRS
1. IN THE PAST MONTH, HAVE YOU WISHED YOU WERE DEAD OR WISHED YOU COULD GO TO SLEEP AND NOT WAKE UP?: NO
2. HAVE YOU ACTUALLY HAD ANY THOUGHTS OF KILLING YOURSELF IN THE PAST MONTH?: NO
6. HAVE YOU EVER DONE ANYTHING, STARTED TO DO ANYTHING, OR PREPARED TO DO ANYTHING TO END YOUR LIFE?: NO

## 2025-03-23 NOTE — ED PROVIDER NOTES
History     Chief Complaint   Patient presents with    Rash     HPI  Edelmira Lorenzo is a 69 year old female who presents to urgent care with concern over rash, possibly shingles noted earlier today.  Patient woke noted 3 areas of erythema on the right anterior chest wall.  She subsequently developed some tingling sensation and has not several other lesions develop near her right shoulder.  She has some minimal discomfort.  No significant pain, pruritus.  She denies any associated fever, chills, allergies, cough, dyspnea, wheezing, nausea, vomiting, diarrhea, abdominal pain.  She does report history of constipation earlier last week treated with OTC oral medication-taken 3 days prior to the onset of rash.  She did have chicken pox as a child.     Allergies:  No Known Allergies    Problem List:    Patient Active Problem List    Diagnosis Date Noted    Pulmonary nodules 06/12/2017     Priority: Medium    CARDIOVASCULAR SCREENING; LDL GOAL LESS THAN 160 10/31/2010     Priority: Medium        Past Medical History:    Past Medical History:   Diagnosis Date    Closed fracture of multiple ribs, unspecified     Unspecified viral meningitis 01/01/1997       Past Surgical History:    Past Surgical History:   Procedure Laterality Date    SURGICAL HISTORY OF -   1999    (R) Breast lumpectomy - benign       Family History:    Family History   Problem Relation Age of Onset    Breast Cancer Mother     Eye Disorder Mother         macular degeneration    Lipids Mother     Arthritis Mother     Cerebrovascular Disease Paternal Grandmother     Arthritis Father     Cardiovascular Father     Heart Disease Father     Heart Disease Maternal Grandmother     C.A.D. Maternal Grandmother     Heart Disease Maternal Grandfather     C.A.D. Maternal Grandfather     Heart Disease Paternal Grandfather        Social History:  Marital Status:   [2]  Social History     Tobacco Use    Smoking status: Never    Smokeless tobacco: Never   Vaping Use     Vaping status: Never Used   Substance Use Topics    Alcohol use: Not Currently     Comment: occ    Drug use: No        Medications:    aspirin 325 MG tablet  mupirocin (BACTROBAN) 2 % external ointment  valACYclovir (VALTREX) 1000 mg tablet      Review of Systems  CONSTITUTIONAL:NEGATIVE for fever, chills, change in weight  INTEGUMENTARY/SKIN: NEGATIVE for worrisome rashes, moles or lesions  EYES: NEGATIVE for vision changes or irritation  ENT/MOUTH: NEGATIVE for ear, mouth and throat problems  RESP:NEGATIVE for significant cough or SOB  GI:  POSITIVE for resolved constipation, bloating NEGATIVE for nausea, vomiting, abdominal pain   Physical Exam   BP: (!) 149/75  Pulse: 66  Temp: 97.5  F (36.4  C)  Resp: 18  SpO2: 99 %  Physical Exam  GENERAL APPEARANCE: healthy, alert and no distress  EYES: EOMI,  PERRL, conjunctiva clear  RESP: lungs clear to auscultation - no rales, rhonchi or wheezes  CV: regular rates and rhythm, normal S1 S2, no murmur noted  SKIN: three 1-2 cm rough erythematous plaques to the anterior right upper chest wall, 2 lesions to the right upper shoulder, no clear vesicular lesions present however does produce small amount of clear fluid when swabbed  ED Course        Procedures       Critical Care time:  none  None     No results found for this or any previous visit (from the past 24 hours).    Medications - No data to display    Assessments & Plan (with Medical Decision Making)     I have reviewed the nursing notes.    I have reviewed the findings, diagnosis, plan and need for follow up with the patient.       Discharge Medication List as of 3/23/2025  1:41 PM        START taking these medications    Details   mupirocin (BACTROBAN) 2 % external ointment Apply topically 3 times daily.Disp-22 g, [id number]      valACYclovir (VALTREX) 1000 mg tablet Take 1 tablet (1,000 mg) by mouth 3 times daily for 7 days., Disp-21 tablet, R-0, E-Prescribe             Final diagnoses:   Rash and nonspecific  skin eruption     69-year-old female presents urgent care with concern over rash to her anterior right chest wall which developed earlier today.  She had elevated blood pressure upon arrival, remainder vital signs stable.  Physical exam findings consistent although not classic for shingles.  Given questionable nature did obtain varicella zoster testing which is pending at time of discharge.   Differential would also include contact dermatitis, impetigo, cutaneous HSV infection.  No signs of dental eye cellulitis, abscess.  Patient was discharged home with prescription for Valtrex, topical Bactroban.  Follow-up if no improvement within the next 3 to 5 days.  Worrisome reasons to return to the ER/UC sooner discussed.    Disclaimer: This note consists of symbols derived from keyboarding, dictation, and/or voice recognition software. As a result, there may be errors in the script that have gone undetected.  Please consider this when interpreting information found in the chart.    3/23/2025   [facility] EMERGENCY DEPT       Cecy Lutz PA-C  03/23/25 [id number]

## 2025-03-24 ENCOUNTER — TELEPHONE (OUTPATIENT)
Dept: NURSING | Facility: CLINIC | Age: 70
End: 2025-03-24
Payer: COMMERCIAL

## 2025-03-24 LAB
SPECIMEN TYPE: ABNORMAL
VZV DNA SPEC QL NAA+PROBE: DETECTED

## 2025-03-24 NOTE — TELEPHONE ENCOUNTER
[facility]    Reason for call: Lab Result Notification     Lab Result (including Rx patient on, if applicable).  If culture, copy of lab report at bottom.  Lab Result:   Component      Latest Ref Rng 3/23/2025  1:37 PM   Varicella Zoster DNA PCR Result      Not Detected  Detected !    Varicella Zoster Virus PCR Specimen Type Swab       Legend:  ! Abnormal    ED Rx: valACYclovir (VALTREX) 1000 mg tablet - Take 1 tablet (1,000 mg) by mouth 3 times daily for 7 days     mupirocin (BACTROBAN) 2 % external ointment - Apply topically 3 times daily     Creatinine Level (mg/dl)   Creatinine   Date Value Ref Range Status   10/27/2015 0.86 0.52 - 1.04 mg/dL Final    Creatinine clearance (ml/min), if applicable    Creatinine clearance cannot be calculated (Patient's most recent lab result is older than the maximum 365 days allowed.)     ED Symptoms: Patient presented to [facility] on 3/23/025 with concern over rash on anterior right chest wall.    RN Recommendations/Instructions per [facility] [place] ED lab result protocol:   [facility] ED lab result protocol utilized: Herpes Zoster and Varicella Zoster protocols  Continue Valtrex pending patient assessment      Unable to reach patient/caregiver.     Left voicemail message requesting a call back to [phone] between 9 a.m. and 5:30 p.m. for patient's ED/UC lab results.      Letter pended to be sent via AskBot.      Yolis Monroe RN

## 2025-03-24 NOTE — LETTER
March 24, 2025        Edelmira Lorenzo  [address] [facility] [address]          Dear Edelmira Lorenzo:    You were seen in the [facility] at [facility] N[facility] [place] on 3/23/2025.  We are unable to reach you by phone, so we are sending you this letter.     It is important that you call [facility] lab result nurse at [phone], as we have information to relay to you AND/OR we MAY have to make some changes in your treatment.    Best time to call back is between 9AM and 5:30PM, 7 days a week.      Sincerely,     [facility] Lab Result RN  [phone]

## 2025-06-12 SDOH — HEALTH STABILITY: PHYSICAL HEALTH: ON AVERAGE, HOW MANY MINUTES DO YOU ENGAGE IN EXERCISE AT THIS LEVEL?: 20 MIN

## 2025-06-12 SDOH — HEALTH STABILITY: PHYSICAL HEALTH: ON AVERAGE, HOW MANY DAYS PER WEEK DO YOU ENGAGE IN MODERATE TO STRENUOUS EXERCISE (LIKE A BRISK WALK)?: 7 DAYS

## 2025-06-12 ASSESSMENT — SOCIAL DETERMINANTS OF HEALTH (SDOH): HOW OFTEN DO YOU GET TOGETHER WITH FRIENDS OR RELATIVES?: ONCE A WEEK

## 2025-06-16 ENCOUNTER — OFFICE VISIT (OUTPATIENT)
Dept: FAMILY MEDICINE | Facility: CLINIC | Age: 70
End: 2025-06-16
Payer: COMMERCIAL

## 2025-06-16 VITALS
OXYGEN SATURATION: 98 % | RESPIRATION RATE: 18 BRPM | WEIGHT: 152 LBS | TEMPERATURE: 97.2 F | BODY MASS INDEX: 25.95 KG/M2 | DIASTOLIC BLOOD PRESSURE: 78 MMHG | HEART RATE: 57 BPM | SYSTOLIC BLOOD PRESSURE: 132 MMHG | HEIGHT: 64 IN

## 2025-06-16 DIAGNOSIS — Z00.00 ROUTINE GENERAL MEDICAL EXAMINATION AT A HEALTH CARE FACILITY: Primary | ICD-10-CM

## 2025-06-16 DIAGNOSIS — E78.5 HYPERLIPIDEMIA LDL GOAL <160: ICD-10-CM

## 2025-06-16 DIAGNOSIS — Z13.1 SCREENING FOR DIABETES MELLITUS: ICD-10-CM

## 2025-06-16 DIAGNOSIS — Z12.11 SCREEN FOR COLON CANCER: ICD-10-CM

## 2025-06-16 DIAGNOSIS — H61.22 IMPACTED CERUMEN OF LEFT EAR: ICD-10-CM

## 2025-06-16 DIAGNOSIS — Z12.31 VISIT FOR SCREENING MAMMOGRAM: ICD-10-CM

## 2025-06-16 DIAGNOSIS — Z13.6 SCREENING FOR CARDIOVASCULAR CONDITION: ICD-10-CM

## 2025-06-16 LAB
ALBUMIN SERPL BCG-MCNC: 4.3 G/DL (ref 3.5–5.2)
ALP SERPL-CCNC: 51 U/L (ref 40–150)
ALT SERPL W P-5'-P-CCNC: 12 U/L (ref 0–50)
ANION GAP SERPL CALCULATED.3IONS-SCNC: 10 MMOL/L (ref 7–15)
AST SERPL W P-5'-P-CCNC: 20 U/L (ref 0–45)
BILIRUB SERPL-MCNC: 0.4 MG/DL
BUN SERPL-MCNC: 11.8 MG/DL (ref 8–23)
CALCIUM SERPL-MCNC: 9.6 MG/DL (ref 8.8–10.4)
CHLORIDE SERPL-SCNC: 104 MMOL/L (ref 98–107)
CREAT SERPL-MCNC: 0.74 MG/DL (ref 0.51–0.95)
EGFRCR SERPLBLD CKD-EPI 2021: 87 ML/MIN/1.73M2
ERYTHROCYTE [DISTWIDTH] IN BLOOD BY AUTOMATED COUNT: 13.6 % (ref 10–15)
GLUCOSE SERPL-MCNC: 92 MG/DL (ref 70–99)
HCO3 SERPL-SCNC: 26 MMOL/L (ref 22–29)
HCT VFR BLD AUTO: 48.8 % (ref 35–47)
HGB BLD-MCNC: 16.6 G/DL (ref 11.7–15.7)
MCH RBC QN AUTO: 30.3 PG (ref 26.5–33)
MCHC RBC AUTO-ENTMCNC: 34 G/DL (ref 31.5–36.5)
MCV RBC AUTO: 89 FL (ref 78–100)
PLATELET # BLD AUTO: 402 10E3/UL (ref 150–450)
POTASSIUM SERPL-SCNC: 4.7 MMOL/L (ref 3.4–5.3)
PROT SERPL-MCNC: 6.7 G/DL (ref 6.4–8.3)
RBC # BLD AUTO: 5.47 10E6/UL (ref 3.8–5.2)
SODIUM SERPL-SCNC: 140 MMOL/L (ref 135–145)
WBC # BLD AUTO: 7 10E3/UL (ref 4–11)

## 2025-06-16 PROCEDURE — 36415 COLL VENOUS BLD VENIPUNCTURE: CPT | Performed by: FAMILY MEDICINE

## 2025-06-16 PROCEDURE — 80053 COMPREHEN METABOLIC PANEL: CPT | Performed by: FAMILY MEDICINE

## 2025-06-16 PROCEDURE — 1126F AMNT PAIN NOTED NONE PRSNT: CPT | Performed by: FAMILY MEDICINE

## 2025-06-16 PROCEDURE — 69210 REMOVE IMPACTED EAR WAX UNI: CPT | Performed by: FAMILY MEDICINE

## 2025-06-16 PROCEDURE — 3078F DIAST BP <80 MM HG: CPT | Performed by: FAMILY MEDICINE

## 2025-06-16 PROCEDURE — 3075F SYST BP GE 130 - 139MM HG: CPT | Performed by: FAMILY MEDICINE

## 2025-06-16 PROCEDURE — 99387 INIT PM E/M NEW PAT 65+ YRS: CPT | Mod: 25 | Performed by: FAMILY MEDICINE

## 2025-06-16 PROCEDURE — 85027 COMPLETE CBC AUTOMATED: CPT | Performed by: FAMILY MEDICINE

## 2025-06-16 ASSESSMENT — PAIN SCALES - GENERAL: PAINLEVEL_OUTOF10: NO PAIN (0)

## 2025-06-16 NOTE — PROGRESS NOTES
Preventive Care Visit  [facility]  Chica Long MD, Family Medicine  Jun 16, 2025  {Provider  Link to SmartSet :963601}    {PROVIDER CHARTING PREFERENCE:873967}    Pam Hickey is a 69 year old, presenting for the following:  Physical        6/16/2025     3:00 PM   Additional Questions   Roomed by Corrie THOMAS MA      HPI  *Does not have medicare    Trouble with wax build up in ears, especially in left  Would like a couple of moles looked at  Was seen in ED 3/23-possible shingles. Wondering if she should have shingles vaccine    Moles to look at:    Left ear wax build up discomfort:    March said had shingles:     Mother had breast cancer  ERCP +??    The 10-year ASCVD risk score (Lizzy DOHERTY, et al., 2019) is: 9.4%    Values used to calculate the score:      Age: 69 years      Sex: Female      Is Non- : No      Diabetic: No      Tobacco smoker: No      Systolic Blood Pressure: 132 mmHg      Is BP treated: No      HDL Cholesterol: 74 mg/dL      Total Cholesterol: 280 mg/dL        {SUPERLIST (Optional):438133}  {additonal problems for provider to add (Optional):720943}    Advance Care Planning  {The storyboard will display whether the patient has ACP docs on file. Hover over the Code section in the storyboard to access the ACP documents. :831951}  Discussed advance care planning with patient; however, patient declined at this time.        6/12/2025   General Health   How would you rate your overall physical health? Good   Feel stress (tense, anxious, or unable to sleep) Not at all         6/12/2025   Nutrition   Diet: Regular (no restrictions)         6/12/2025   Exercise   Days per week of moderate/strenous exercise 7 days   Average minutes spent exercising at this level 20 min         6/12/2025   Social Factors   Frequency of gathering with friends or relatives Once a week   Worry food won't last until get money to buy more No   Food not last or not have enough  money for food? No   Do you have housing? (Housing is defined as stable permanent housing and does not include staying outside in a car, in a tent, in an abandoned building, in an overnight shelter, or couch-surfing.) Yes   Are you worried about losing your housing? No   Lack of transportation? No   Unable to get utilities (heat,electricity)? No         6/12/2025   Fall Risk   Fallen 2 or more times in the past year? No   Trouble with walking or balance? No          6/12/2025   Activities of Daily Living- Home Safety   Needs help with the following daily activites None of the above   Safety concerns in the home None of the above         6/12/2025   Dental   Dentist two times every year? Yes         6/12/2025   Hearing Screening   Hearing concerns? None of the above         6/12/2025   Driving Risk Screening   Patient/family members have concerns about driving No         6/12/2025   General Alertness/Fatigue Screening   Have you been more tired than usual lately? No         6/12/2025   Urinary Incontinence Screening   Bothered by leaking urine in past 6 months No         Today's PHQ-2 Score:       6/16/2025     2:59 PM   PHQ-2 ( 1999 Pfizer)   Q1: Little interest or pleasure in doing things 0   Q2: Feeling down, depressed or hopeless 0   PHQ-2 Score 0    Q1: Little interest or pleasure in doing things Not at all   Q2: Feeling down, depressed or hopeless Not at all   PHQ-2 Score 0       Patient-reported           6/12/2025   Substance Use   Alcohol more than 3/day or more than 7/wk Not Applicable   Do you have a current opioid prescription? No   How severe/bad is pain from 1 to 10? 0/10 (No Pain)   Do you use any other substances recreationally? No     Social History     Tobacco Use    Smoking status: Never    Smokeless tobacco: Never   Vaping Use    Vaping status: Never Used   Substance Use Topics    Alcohol use: Not Currently     Comment: occ    Drug use: No     {Provider  If there are gaps in the social history  shown above, please follow the link to update and then refresh the note Link to Social and Substance History :810441}      6/1/2022   LAST FHS-7 RESULTS   1st degree relative breast or ovarian cancer Yes   Any relative bilateral breast cancer No   Any male have breast cancer No   Any ONE woman have BOTH breast AND ovarian cancer No   Any woman with breast cancer before 50yrs No   2 or more relatives with breast AND/OR ovarian cancer No   2 or more relatives with breast AND/OR bowel cancer No     {If any of the questions to the FHS7 are answered yes, consider referral for genetic counseling.    Additional indications for genetic referral include personal history of breast or ovarian cancer, genetic mutation in 1st degree relative which increases risk of breast cancer including BRCA1, BRCA2, LINDA, PALB 2, TP53, CHEK2, PTEN, CDH1, STK11 (per ACS) and/or 1st degree relative with history of pancreatic or high-risk prostate cancer (per NCCN):359755}   {Mammogram Decision Support (Optional):881018}      History of abnormal Pap smear: { :151444}        Latest Ref Rng & Units 6/12/2017    10:15 AM 6/12/2017     9:41 AM 1/12/2012     5:16 PM   PAP / HPV   PAP (Historical)   NIL  NIL    HPV 16 DNA NEG Negative      HPV 18 DNA NEG Negative      Other HR HPV NEG Negative        ASCVD Risk   The 10-year ASCVD risk score (Lizzy DOHERTY, et al., 2019) is: 9.4%    Values used to calculate the score:      Age: 69 years      Sex: Female      Is Non- : No      Diabetic: No      Tobacco smoker: No      Systolic Blood Pressure: 132 mmHg      Is BP treated: No      HDL Cholesterol: 74 mg/dL      Total Cholesterol: 280 mg/dL    {Link to Fracture Risk Assessment Tool (Optional):310708}    {Provider  REQUIRED FOR AWV Use the storyboard to review patient history, after sections have been marked as reviewed, refresh note to capture documentation:644091}  {Provider   REQUIRED AWV use this link to review and update  "sexual activity history  after section has been marked as reviewed, refresh note to capture documentation:318185}  Reviewed and updated as needed this visit by Provider                    {HISTORY OPTIONS (Optional):760046}  Current providers sharing in care for this patient include:  Patient Care Team:  Chica Long MD as PCP - General (Family Medicine)    The following health maintenance items are reviewed in Epic and correct as of today:  Health Maintenance   Topic Date Due    PNEUMOCOCCAL VACCINE 50+ YEARS (1 of 1 - PCV) Never done    ZOSTER VACCINE (1 of 2) Never done    COLORECTAL CANCER SCREENING  07/23/2020    DTAP/TDAP/TD VACCINE (2 - Td or Tdap) 01/12/2022    MEDICARE ANNUAL WELLNESS VISIT  04/14/2023    ANNUAL REVIEW OF HM ORDERS  04/14/2023    MAMMO SCREENING  06/01/2024    COVID-19 VACCINE (6 - 2024-25 season) 03/23/2025    DIABETES SCREENING  04/14/2025    FALL RISK ASSESSMENT  06/16/2026    LIPID  04/14/2027    ADVANCE CARE PLANNING  06/16/2030    DEXA  06/01/2037    HEPATITIS C SCREENING  Completed    PHQ-2 (once per calendar year)  Completed    INFLUENZA VACCINE  Completed    RSV VACCINE  Completed    HPV VACCINE  Aged Out    MENINGITIS VACCINE  Aged Out    PAP  Discontinued       {ROS Picklists (Optional):519649}     Objective    Exam  /78   Pulse 57   Temp 97.2  F (36.2  C) (Tympanic)   Resp 18   Ht 1.632 m (5' 4.25\")   Wt 68.9 kg (152 lb)   LMP 10/21/2006   SpO2 98%   BMI 25.89 kg/m     Estimated body mass index is 25.89 kg/m  as calculated from the following:    Height as of this encounter: 1.632 m (5' 4.25\").    Weight as of this encounter: 68.9 kg (152 lb).    Physical Exam  {Exam Choices (Optional):944552}         6/16/2025   Mini Cog   Clock Draw Score 2 Normal   3 Item Recall 1 object recalled   Mini Cog Total Score 3     {A Mini-Cog total score of 0-2 suggests the possibility of dementia, score of 3-5 suggests no dementia:918588}        Signed Electronically by: Chica " MD Ethan  {Email feedback regarding this note to [email or web address]   :555963}

## 2025-06-16 NOTE — PATIENT INSTRUCTIONS
Patient Education   Preventive Care Advice   This is general advice given by our system to help you stay healthy. However, your care team may have specific advice just for you. Please talk to your care team about your preventive care needs.  Nutrition  Eat 5 or more servings of fruits and vegetables each day.  Try wheat bread, brown rice and whole grain pasta (instead of white bread, rice, and pasta).  Get enough calcium and vitamin D. Check the label on foods and aim for 100% of the RDA (recommended daily allowance).  Lifestyle  Exercise at least 150 minutes each week  (30 minutes a day, 5 days a week).  Do muscle strengthening activities 2 days a week. These help control your weight and prevent disease.  No smoking.  Wear sunscreen to prevent skin cancer.  Have a dental exam and cleaning every 6 months.  Yearly exams  See your health care team every year to talk about:  Any changes in your health.  Any medicines your care team has prescribed.  Preventive care, family planning, and ways to prevent chronic diseases.  Shots (vaccines)   HPV shots (up to age 26), if you've never had them before.  Hepatitis B shots (up to age 59), if you've never had them before.  COVID-19 shot: Get this shot when it's due.  Flu shot: Get a flu shot every year.  Tetanus shot: Get a tetanus shot every 10 years.  Pneumococcal, hepatitis A, and RSV shots: Ask your care team if you need these based on your risk.  Shingles shot (for age 50 and up)  General health tests  Diabetes screening:  Starting at age 35, Get screened for diabetes at least every 3 years.  If you are younger than age 35, ask your care team if you should be screened for diabetes.  Cholesterol test: At age 39, start having a cholesterol test every 5 years, or more often if advised.  Bone density scan (DEXA): At age 50, ask your care team if you should have this scan for osteoporosis (brittle bones).  Hepatitis C: Get tested at least once in your life.  STIs (sexually  transmitted infections)  Before age 24: Ask your care team if you should be screened for STIs.  After age 24: Get screened for STIs if you're at risk. You are at risk for STIs (including HIV) if:  You are sexually active with more than one person.  You don't use condoms every time.  You or a partner was diagnosed with a sexually transmitted infection.  If you are at risk for HIV, ask about PrEP medicine to prevent HIV.  Get tested for HIV at least once in your life, whether you are at risk for HIV or not.  Cancer screening tests  Cervical cancer screening: If you have a cervix, begin getting regular cervical cancer screening tests starting at age 21.  Breast cancer scan (mammogram): If you've ever had breasts, begin having regular mammograms starting at age 40. This is a scan to check for breast cancer.  Colon cancer screening: It is important to start screening for colon cancer at age 45.  Have a colonoscopy test every 10 years (or more often if you're at risk) Or, ask your provider about stool tests like a FIT test every year or Cologuard test every 3 years.  To learn more about your testing options, visit:   .  For help making a decision, visit:   https://bit.ly/hx13730.  Prostate cancer screening test: If you have a prostate, ask your care team if a prostate cancer screening test (PSA) at age 55 is right for you.  Lung cancer screening: If you are a current or former smoker ages 50 to 80, ask your care team if ongoing lung cancer screenings are right for you.  For informational purposes only. Not to replace the advice of your health care provider. Copyright   2023 [employer]. All rights reserved. Clinically reviewed by the [facility] Transitions Program. [employer] 090915 - REV 01/24.

## 2025-06-17 ENCOUNTER — PATIENT OUTREACH (OUTPATIENT)
Dept: CARE COORDINATION | Facility: CLINIC | Age: 70
End: 2025-06-17
Payer: COMMERCIAL

## 2025-06-23 ENCOUNTER — RESULTS FOLLOW-UP (OUTPATIENT)
Dept: FAMILY MEDICINE | Facility: CLINIC | Age: 70
End: 2025-06-23